# Patient Record
Sex: FEMALE | Race: OTHER | HISPANIC OR LATINO | Employment: FULL TIME | URBAN - METROPOLITAN AREA
[De-identification: names, ages, dates, MRNs, and addresses within clinical notes are randomized per-mention and may not be internally consistent; named-entity substitution may affect disease eponyms.]

---

## 2018-07-21 ENCOUNTER — APPOINTMENT (EMERGENCY)
Dept: RADIOLOGY | Facility: HOSPITAL | Age: 53
End: 2018-07-21
Payer: OTHER MISCELLANEOUS

## 2018-07-21 ENCOUNTER — HOSPITAL ENCOUNTER (EMERGENCY)
Facility: HOSPITAL | Age: 53
Discharge: HOME | End: 2018-07-21
Attending: EMERGENCY MEDICINE | Admitting: EMERGENCY MEDICINE
Payer: OTHER MISCELLANEOUS

## 2018-07-21 VITALS
OXYGEN SATURATION: 100 % | RESPIRATION RATE: 20 BRPM | TEMPERATURE: 98.1 F | SYSTOLIC BLOOD PRESSURE: 139 MMHG | HEIGHT: 62 IN | HEART RATE: 88 BPM | BODY MASS INDEX: 42.69 KG/M2 | DIASTOLIC BLOOD PRESSURE: 80 MMHG | WEIGHT: 232 LBS

## 2018-07-21 DIAGNOSIS — M25.561 ACUTE BILATERAL KNEE PAIN: Primary | ICD-10-CM

## 2018-07-21 DIAGNOSIS — M25.562 ACUTE BILATERAL KNEE PAIN: Primary | ICD-10-CM

## 2018-07-21 PROCEDURE — 73560 X-RAY EXAM OF KNEE 1 OR 2: CPT

## 2018-07-21 PROCEDURE — 99283 EMERGENCY DEPT VISIT LOW MDM: CPT

## 2018-07-21 NOTE — DISCHARGE INSTRUCTIONS

## 2018-07-21 NOTE — ED PROVIDER NOTES
History  Chief Complaint   Patient presents with    Knee Injury     Pt c/o bilateral knee pain after falling at work yesterday  Pt had torn right meniscus repaired June 6th  HPI  56-year-old female with history of asthma, diabetes, and hypertension presents to the emergency department for evaluation of bilateral knee pain  Patient states that while she was working yesterday, she missed a step and landed on both knees on the sidewalk  She was able to get up and ambulate immediately following the fall, but has had mild tenderness of both knees since that time  Patient's main concern is that she had right knee meniscal repair in early July and she is worried that she may have injured her meniscus  She denies any pain with ambulation, locking, or cracking  She denies any weakness, numbness, paresthesias  Review of systems otherwise negative  None       Past Medical History:   Diagnosis Date    Asthma     Diabetes mellitus ([facility] [address] )     Hypertension        Past Surgical History:   Procedure Laterality Date    HERNIA REPAIR      HYSTERECTOMY      KNEE CARTILAGE SURGERY Right     UTERINE FIBROID SURGERY         History reviewed  No pertinent family history  I have reviewed and agree with the history as documented  Social History   Substance Use Topics    Smoking status: Former Smoker    Smokeless tobacco: Never Used    Alcohol use Yes      Comment: Occasionally        Review of Systems   Constitutional: Negative for chills and fever  Respiratory: Negative for shortness of breath  Gastrointestinal: Negative for abdominal pain, nausea and vomiting  Musculoskeletal: Positive for arthralgias  Negative for gait problem and joint swelling  Skin: Negative for rash and wound  Allergic/Immunologic: Negative for immunocompromised state  Neurological: Negative for headaches  Psychiatric/Behavioral: The patient is not nervous/anxious  All other systems reviewed and are negative        Physical Exam  Physical Exam   Constitutional: She is oriented to person, place, and time  She appears well-nourished  No distress  HENT:   Head: Normocephalic and atraumatic  Eyes: EOM are normal    Neck: Normal range of motion  Neck supple  Cardiovascular: Normal rate and regular rhythm  Pulmonary/Chest: Effort normal and breath sounds normal  No respiratory distress  Abdominal: Soft  She exhibits no distension  There is no tenderness  Musculoskeletal: Normal range of motion  She exhibits tenderness  Right knee: She exhibits normal range of motion, no LCL laxity, no bony tenderness, normal meniscus and no MCL laxity  No tenderness found  No medial joint line, no lateral joint line, no MCL, no LCL and no patellar tendon tenderness noted  Left knee: She exhibits ecchymosis and bony tenderness  She exhibits normal range of motion, no LCL laxity, normal meniscus and no MCL laxity  No medial joint line, no lateral joint line, no MCL, no LCL and no patellar tendon tenderness noted  Legs:  Neurological: She is alert and oriented to person, place, and time  Skin: Skin is warm and dry  She is not diaphoretic  Psychiatric: She has a normal mood and affect  Her behavior is normal    Nursing note and vitals reviewed  Vital Signs  ED Triage Vitals [07/21/18 1745]   Temperature Pulse Respirations Blood Pressure SpO2   98 1 °F (36 7 °C) 88 20 139/80 100 %      Temp Source Heart Rate Source Patient Position - Orthostatic VS BP Location FiO2 (%)   Oral Monitor Sitting Left arm --      Pain Score       8           Vitals:    07/21/18 1745   BP: 139/80   Pulse: 88   Patient Position - Orthostatic VS: Sitting       Visual Acuity      ED Medications  Medications - No data to display    Diagnostic Studies  Results Reviewed     None                 XR knee 1 or 2 views right   Final Result by Xuan Scott MD (07/22 4811)      No acute osseous abnormality              Workstation performed: [id number]         XR knee 1 or 2 views left   Final Result by Van Rodriguez MD (07/22 4900)      No acute osseous abnormality  Workstation performed: [id number]                    Procedures  Procedures       Phone Contacts  ED Phone Contact    ED Course                               MDM  Number of Diagnoses or Management Options  Acute bilateral knee pain:   Diagnosis management comments: 81-year-old female with knee pain s/p fall at work  Shared decision making with patient - requests XRs while in the ED to rule out fracture, aware that XRs will not show meniscal injury, but that clinical exam is reassuring  Declines analgesia at this time  CritCare Time    Disposition  Final diagnoses:   Acute bilateral knee pain     Time reflects when diagnosis was documented in both MDM as applicable and the Disposition within this note     Time User Action Codes Description Comment    7/21/2018  6:42 PM Arnav Kowalski Add [M25 561,  M25 562] Acute bilateral knee pain       ED Disposition     ED Disposition Condition Comment    Discharge  Serge Martell discharge to home/self care  Condition at discharge: Good        Follow-up Information     Follow up With Specialties Details Why Contact Info Additional Information    [address] [facility] Occupational Medicine Schedule an appointment as soon as possible for a visit in 1 day As needed [address], [facility]          There are no discharge medications for this patient  No discharge procedures on file      ED Provider  Electronically Signed by           Erin Moncada MD  07/23/18 8595

## 2023-09-01 ENCOUNTER — TRANSCRIBE ORDERS (OUTPATIENT)
Age: 58
End: 2023-09-01

## 2023-09-01 ENCOUNTER — TELEPHONE (OUTPATIENT)
Age: 58
End: 2023-09-01

## 2023-09-01 DIAGNOSIS — Z12.11 ENCOUNTER FOR SCREENING COLONOSCOPY: Primary | ICD-10-CM

## 2024-09-04 ENCOUNTER — OFFICE VISIT (OUTPATIENT)
Age: 59
End: 2024-09-04
Payer: COMMERCIAL

## 2024-09-04 VITALS
TEMPERATURE: 98.8 F | DIASTOLIC BLOOD PRESSURE: 75 MMHG | OXYGEN SATURATION: 98 % | RESPIRATION RATE: 18 BRPM | HEART RATE: 95 BPM | BODY MASS INDEX: 32.48 KG/M2 | WEIGHT: 177.6 LBS | SYSTOLIC BLOOD PRESSURE: 146 MMHG

## 2024-09-04 DIAGNOSIS — R68.89 FLU-LIKE SYMPTOMS: ICD-10-CM

## 2024-09-04 DIAGNOSIS — J20.9 ACUTE BRONCHITIS, UNSPECIFIED ORGANISM: Primary | ICD-10-CM

## 2024-09-04 LAB
SARS-COV-2 AG UPPER RESP QL IA: NEGATIVE
VALID CONTROL: NORMAL

## 2024-09-04 PROCEDURE — S9083 URGENT CARE CENTER GLOBAL: HCPCS | Performed by: PHYSICIAN ASSISTANT

## 2024-09-04 PROCEDURE — G0382 LEV 3 HOSP TYPE B ED VISIT: HCPCS | Performed by: PHYSICIAN ASSISTANT

## 2024-09-04 PROCEDURE — 87811 SARS-COV-2 COVID19 W/OPTIC: CPT | Performed by: PHYSICIAN ASSISTANT

## 2024-09-04 PROCEDURE — 87636 SARSCOV2 & INF A&B AMP PRB: CPT | Performed by: PHYSICIAN ASSISTANT

## 2024-09-04 RX ORDER — PREDNISONE 20 MG/1
TABLET ORAL
Qty: 8 TABLET | Refills: 0 | Status: SHIPPED | OUTPATIENT
Start: 2024-09-04

## 2024-09-04 RX ORDER — ATORVASTATIN CALCIUM 10 MG/1
TABLET, FILM COATED ORAL
COMMUNITY

## 2024-09-04 RX ORDER — CLOBETASOL PROPIONATE 0.5 MG/G
OINTMENT TOPICAL
COMMUNITY
Start: 2024-07-11

## 2024-09-04 RX ORDER — FLUTICASONE FUROATE AND VILANTEROL 100; 25 UG/1; UG/1
POWDER RESPIRATORY (INHALATION)
COMMUNITY

## 2024-09-04 RX ORDER — MONTELUKAST SODIUM 10 MG/1
10 TABLET ORAL
COMMUNITY
Start: 2024-08-13 | End: 2025-08-13

## 2024-09-04 RX ORDER — ALBUTEROL SULFATE 0.83 MG/ML
SOLUTION RESPIRATORY (INHALATION)
COMMUNITY

## 2024-09-04 RX ORDER — AZITHROMYCIN 250 MG/1
TABLET, FILM COATED ORAL
Qty: 6 TABLET | Refills: 0 | Status: SHIPPED | OUTPATIENT
Start: 2024-09-04 | End: 2024-09-08

## 2024-09-04 NOTE — LETTER
September 4, 2024     Patient: Becky Chambers   YOB: 1965   Date of Visit: 9/4/2024       To Whom it May Concern:    Becky Chambers was seen in my clinic on 9/4/2024. She may return to work on 9/5/2024 .    If you have any questions or concerns, please don't hesitate to call.         Sincerely,          Fabio Valentin PA-C        CC: No Recipients

## 2024-09-04 NOTE — PATIENT INSTRUCTIONS
Patient Education     Bronchitis, Adult ED   General Information   You came to the Emergency Department (ED) for acute bronchitis. This is an infection of the bronchi which are tubes that carry air into your lungs. Most of the time, this infection is caused by a virus so an antibiotic won’t help. Your cough should get better within 2 to 3 weeks, but may take a bit longer.  What care is needed at home?   Call your regular doctor to let them know you were in the ED. Make a follow-up appointment if you were told to.  Do not smoke or be in smoke-filled places. Avoid other things that may cause breathing problems like fumes, pollution, dust, and other common allergens.  Drink lots of water, juice, or broth to replace fluids lost in runny nose and fever.  If you have medicines to take when you are feeling short of breath, be sure to carry them with you. Then, you can take them when needed.  If you smoke, stop.  Take warm, steamy showers to help soothe the cough.  Use a cool mist humidifier. This may make it easier to breathe.  Use hard candy or cough drops to soothe sore throat and cough.  Wash your hands often. This will help prevent you from spreading germs to others.  When do I need to get emergency help?   Call for an ambulance right away if:   You are having so much trouble breathing that you can only say one or two words at a time.  You need to sit upright at all times to be able to breathe and or cannot lie down.  Return to the ED if:   You have trouble breathing when talking or sitting still.  When do I need to call the doctor?   You have a fever of 100.4°F (38°C) or higher or chills.  You have chest pain when you cough, have trouble breathing but can still talk in full sentences, or cough up blood.  You develop a barking cough.  You are still coughing in 3 weeks.  You have new or worsening symptoms.  Last Reviewed Date   2020-07-22  Consumer Information Use and Disclaimer   This generalized information is a  limited summary of diagnosis, treatment, and/or medication information. It is not meant to be comprehensive and should be used as a tool to help the user understand and/or assess potential diagnostic and treatment options. It does NOT include all information about conditions, treatments, medications, side effects, or risks that may apply to a specific patient. It is not intended to be medical advice or a substitute for the medical advice, diagnosis, or treatment of a health care provider based on the health care provider's examination and assessment of a patient’s specific and unique circumstances. Patients must speak with a health care provider for complete information about their health, medical questions, and treatment options, including any risks or benefits regarding use of medications. This information does not endorse any treatments or medications as safe, effective, or approved for treating a specific patient. Up[employer], Inc[employer] and its affiliates disclaim any warranty or liability relating to this information or the use thereof. The use of this information is governed by the Terms of Use, available at https://www.wolterskluwer.com/en/know/clinical-effectiveness-terms   Copyright   Copyright © 2024 [employer], [employer] and its affiliates and/or licensors. All rights reserved.

## 2024-09-04 NOTE — PROGRESS NOTES
[facility]        NAME: Becky Chambers is a 59 y.o. female  : 1965    MRN: [id number]  DATE: 2024  TIME: 7:34 PM    Assessment and Plan   Acute bronchitis, unspecified organism [J20.9]  1. Acute bronchitis, unspecified organism  predniSONE 20 mg tablet    azithromycin (ZITHROMAX) 250 mg tablet      2. Flu-like symptoms  Poct Covid 19 Rapid Antigen Test    Covid19 and INFLUENZA A/B PCR            Patient Instructions     Your rapid COVID was negative  COVID/influenza testing pending    Acute bronchitis with sputum production with Hx of Asthma    Prednisone as directed  Azithromycin as directed  Increase fluids  Continue to use your Albuterol inhaler as discussed    Follow up with PCP in 3-5 days.  Proceed to  ER if symptoms worsen.    If tests are performed, our office will contact you with results only if changes need to made to the care plan discussed with you at the visit. You can review your full results on [facility].    Chief Complaint     Chief Complaint   Patient presents with    Cold Like Symptoms     Symptoms started 2 days ago. C/o nasal congestion, chest congestion, nasal drip, body aches and headaches.          History of Present Illness       60 yo female with c/o symptoms of nasal congestion, chest congestion, nasal drip, body aches, chills and headaches which began 3 days ago.             Review of Systems   Review of Systems   Constitutional:  Positive for chills and fatigue. Negative for activity change, appetite change and fever.   HENT:  Positive for congestion, rhinorrhea and sore throat. Negative for ear pain, postnasal drip and sinus pressure.    Eyes:  Negative for photophobia and visual disturbance.   Respiratory:  Positive for cough, chest tightness and wheezing. Negative for shortness of breath.    Cardiovascular:  Negative for chest pain and palpitations.   Gastrointestinal:  Negative for abdominal pain, diarrhea, nausea and vomiting.   Musculoskeletal:   Negative for arthralgias, myalgias and neck stiffness.   Skin:  Negative for color change and rash.   Neurological:  Negative for dizziness, weakness and headaches.   Psychiatric/Behavioral: Negative.           Current Medications       Current Outpatient Medications:     azithromycin (ZITHROMAX) 250 mg tablet, Take 2 tablets today then 1 tablet daily x 4 days, Disp: 6 tablet, Rfl: 0    clobetasol (TEMOVATE) 0.05 % ointment, APPLY TO AFFECTED AREA (TOP OF LABIA) AT BEDTIME FOR TWO WEEKS, THEN USE EVERY OTHER DAY AT BEDTIME, Disp: , Rfl:     montelukast (SINGULAIR) 10 mg tablet, Take 10 mg by mouth, Disp: , Rfl:     predniSONE 20 mg tablet, 2 tabs by mouth daily at once for 4 days, Disp: 8 tablet, Rfl: 0    albuterol (2.5 mg/3 mL) 0.083 % nebulizer solution, , Disp: , Rfl:     atorvastatin (LIPITOR) 10 mg tablet, TAKE 1 TABLET BY MOUTH EVERY DAY AT NIGHT, Disp: , Rfl:     Cobalamin Combinations (FOLTRATE PO), As directed, Disp: , Rfl:     Fluticasone Furoate-Vilanterol 100-25 mcg/actuation inhaler, , Disp: , Rfl:     Current Allergies     Allergies as of 09/04/2024    (No Known Allergies)            The following portions of the patient's history were reviewed and updated as appropriate: allergies, current medications, past family history, past medical history, past social history, past surgical history and problem list.     Past Medical History:   Diagnosis Date    Asthma     Diabetes mellitus (HCC)     Hypertension        Past Surgical History:   Procedure Laterality Date    HERNIA REPAIR      HYSTERECTOMY      KNEE CARTILAGE SURGERY Right     UTERINE FIBROID SURGERY         History reviewed. No pertinent family history.      Medications have been verified.        Objective   /75   Pulse 95   Temp 98.8 °F (37.1 °C)   Resp 18   Wt 80.6 kg (177 lb 9.6 oz)   SpO2 98%   BMI 32.48 kg/m²        Physical Exam     Physical Exam  Vitals and nursing note reviewed.   Constitutional:       General: She is not in  acute distress.     Appearance: Normal appearance. She is well-developed. She is ill-appearing. She is not toxic-appearing or diaphoretic.   HENT:      Head: Normocephalic and atraumatic.      Right Ear: Tympanic membrane, ear canal and external ear normal.      Left Ear: Tympanic membrane, ear canal and external ear normal.      Nose: Congestion and rhinorrhea present.      Mouth/Throat:      Mouth: Mucous membranes are moist. No oral lesions.      Pharynx: Oropharynx is clear. Posterior oropharyngeal erythema present. No oropharyngeal exudate.   Eyes:      General: No scleral icterus.     Extraocular Movements: Extraocular movements intact.      Right eye: Normal extraocular motion.      Left eye: Normal extraocular motion.      Conjunctiva/sclera: Conjunctivae normal.      Pupils: Pupils are equal, round, and reactive to light.   Cardiovascular:      Rate and Rhythm: Normal rate and regular rhythm.      Pulses: Normal pulses.      Heart sounds: Normal heart sounds.   Pulmonary:      Effort: Pulmonary effort is normal. No respiratory distress.      Breath sounds: Wheezing and rhonchi present. No rales.   Musculoskeletal:         General: Normal range of motion.      Cervical back: Normal range of motion and neck supple. No tenderness.   Lymphadenopathy:      Cervical: No cervical adenopathy.   Skin:     General: Skin is warm and dry.      Capillary Refill: Capillary refill takes less than 2 seconds.      Findings: No rash.   Neurological:      General: No focal deficit present.      Mental Status: She is alert and oriented to person, place, and time.      Coordination: Coordination normal.      Gait: Gait normal.   Psychiatric:         Mood and Affect: Mood normal.         Behavior: Behavior normal.

## 2024-09-05 LAB
FLUAV RNA RESP QL NAA+PROBE: NEGATIVE
FLUBV RNA RESP QL NAA+PROBE: NEGATIVE
SARS-COV-2 RNA RESP QL NAA+PROBE: NEGATIVE

## 2025-05-13 ENCOUNTER — OFFICE VISIT (OUTPATIENT)
Age: 60
End: 2025-05-13
Payer: COMMERCIAL

## 2025-05-13 VITALS
WEIGHT: 179.2 LBS | SYSTOLIC BLOOD PRESSURE: 120 MMHG | TEMPERATURE: 97.9 F | DIASTOLIC BLOOD PRESSURE: 68 MMHG | BODY MASS INDEX: 32.78 KG/M2 | OXYGEN SATURATION: 100 % | RESPIRATION RATE: 18 BRPM | HEART RATE: 89 BPM

## 2025-05-13 DIAGNOSIS — J06.9 ACUTE URI: Primary | ICD-10-CM

## 2025-05-13 PROCEDURE — S9083 URGENT CARE CENTER GLOBAL: HCPCS | Performed by: NURSE PRACTITIONER

## 2025-05-13 PROCEDURE — G0382 LEV 3 HOSP TYPE B ED VISIT: HCPCS | Performed by: NURSE PRACTITIONER

## 2025-05-13 RX ORDER — BROMPHENIRAMINE MALEATE, PSEUDOEPHEDRINE HYDROCHLORIDE, AND DEXTROMETHORPHAN HYDROBROMIDE 2; 30; 10 MG/5ML; MG/5ML; MG/5ML
5 SYRUP ORAL 4 TIMES DAILY PRN
Qty: 120 ML | Refills: 0 | Status: SHIPPED | OUTPATIENT
Start: 2025-05-13

## 2025-05-13 NOTE — PATIENT INSTRUCTIONS
Start daily emergen C while symptoms persist   PRN motrin/tylenol q8hrs for pain/fever   PRN bromfed q6hrs for cough/congestion  Increase PRN home albuterol to q6hrs x 24 hrs then drop to PRN  Continue daily breo   Recommend starting daily antihistamine   Continue home singulair   Humidifier HS   OTC debrox to bilateral ears or return for irrigation once feeling better   F/u with PCP as needed  Proceed to ER should symptoms worsen

## 2025-05-13 NOTE — LETTER
May 13, 2025     Patient: Becky Chambers   YOB: 1965   Date of Visit: 5/13/2025       To Whom It May Concern:    It is my medical opinion that Becky Chambers may return to work on 5/15/2025 .    If you have any questions or concerns, please don't hesitate to call.         Sincerely,        RACHNA Zapien    CC: No Recipients

## 2025-05-13 NOTE — PROGRESS NOTES
[facility]  Name: Becky Chambers      : 1965      MRN: [id number]  Encounter Provider: RACHNA Zapien  Encounter Date: 2025   Encounter department: [facility] [place]  :  Assessment & Plan  Acute URI    Orders:    brompheniramine-pseudoephedrine-DM 30-2-10 MG/5ML syrup; Take 5 mL by mouth 4 (four) times a day as needed for cough or congestion      Patient Instructions  Start daily emergen C while symptoms persist   PRN motrin/tylenol q8hrs for pain/fever   PRN bromfed q6hrs for cough/congestion  Increase PRN home albuterol to q6hrs x 24 hrs then drop to PRN  Continue daily breo   Recommend starting daily antihistamine   Continue home singulair   Humidifier HS   OTC debrox to bilateral ears or return for irrigation once feeling better   F/u with PCP as needed  Proceed to ER should symptoms worsen     If tests are performed, our office will contact you with results only if changes need to made to the care plan discussed with you at the visit. [facility]    Chief Complaint:   Chief Complaint   Patient presents with    Cold Like Symptoms     Symptoms stared 2 days ago, pt complains of cough, excessive sweat, tightening in chest, sore throat, and headache.      History of Present Illness   60 y/o female with PMH of asthma presents for sore throat, headache, cough, sneezing since . Patient has tried mucinex but reports that it gives her indigestion and has not helped much.          Review of Systems   Constitutional:  Positive for chills.   HENT:  Positive for sneezing and sore throat.    Respiratory:  Positive for cough.    Neurological:  Positive for headaches.   All other systems reviewed and are negative.    Past Medical History   Past Medical History:   Diagnosis Date    Asthma     Diabetes mellitus (HCC)     Hypertension      Past Surgical History:   Procedure Laterality Date    HERNIA REPAIR      HYSTERECTOMY      KNEE  CARTILAGE SURGERY Right     UTERINE FIBROID SURGERY       History reviewed. No pertinent family history.  she reports that she has quit smoking. She has never used smokeless tobacco. She reports current alcohol use. She reports that she does not use drugs.  Current Outpatient Medications   Medication Instructions    albuterol (2.5 mg/3 mL) 0.083 % nebulizer solution     atorvastatin (LIPITOR) 10 mg tablet TAKE 1 TABLET BY MOUTH EVERY DAY AT NIGHT    brompheniramine-pseudoephedrine-DM 30-2-10 MG/5ML syrup 5 mL, Oral, 4 times daily PRN    clobetasol (TEMOVATE) 0.05 % ointment APPLY TO AFFECTED AREA (TOP OF LABIA) AT BEDTIME FOR TWO WEEKS, THEN USE EVERY OTHER DAY AT BEDTIME    Cobalamin Combinations (FOLTRATE PO) As directed    Fluticasone Furoate-Vilanterol 100-25 mcg/actuation inhaler     montelukast (SINGULAIR) 10 mg    predniSONE 20 mg tablet 2 tabs by mouth daily at once for 4 days   No Known Allergies     Objective   /68 (BP Location: Right arm, Patient Position: Sitting, Cuff Size: Standard)   Pulse 89   Temp 97.9 °F (36.6 °C) (Tympanic)   Resp 18   Wt 81.3 kg (179 lb 3.2 oz)   SpO2 100%   BMI 32.78 kg/m²      Physical Exam  Constitutional:       Appearance: Normal appearance.   HENT:      Head: Normocephalic and atraumatic.      Right Ear: Ear canal and external ear normal. There is impacted cerumen.      Left Ear: Ear canal and external ear normal. There is impacted cerumen.      Nose: Nose normal.      Mouth/Throat:      Mouth: Mucous membranes are moist.      Pharynx: Oropharynx is clear. Posterior oropharyngeal erythema present.   Eyes:      Conjunctiva/sclera: Conjunctivae normal.   Cardiovascular:      Rate and Rhythm: Normal rate and regular rhythm.      Pulses: Normal pulses.      Heart sounds: Normal heart sounds.   Pulmonary:      Effort: Pulmonary effort is normal.      Breath sounds: Normal breath sounds. No wheezing, rhonchi or rales.   Abdominal:      Palpations: Abdomen is soft.  "  Musculoskeletal:         General: Normal range of motion.      Cervical back: Normal range of motion.   Lymphadenopathy:      Cervical: No cervical adenopathy.   Skin:     General: Skin is warm and dry.   Neurological:      General: No focal deficit present.      Mental Status: She is alert and oriented to person, place, and time.   Psychiatric:         Behavior: Behavior normal.         Thought Content: Thought content normal.         Portions of the record may have been created with voice recognition software.  Occasional wrong word or \"sound a like\" substitutions may have occurred due to the inherent limitations of voice recognition software.  Read the chart carefully and recognize, using context, where substitutions have occurred.  "